# Patient Record
Sex: FEMALE | Race: BLACK OR AFRICAN AMERICAN
[De-identification: names, ages, dates, MRNs, and addresses within clinical notes are randomized per-mention and may not be internally consistent; named-entity substitution may affect disease eponyms.]

---

## 2018-05-16 ENCOUNTER — HOSPITAL ENCOUNTER (EMERGENCY)
Dept: HOSPITAL 74 - JERFT | Age: 23
Discharge: HOME | End: 2018-05-16
Payer: SELF-PAY

## 2018-05-16 VITALS — BODY MASS INDEX: 25.4 KG/M2

## 2018-05-16 VITALS — SYSTOLIC BLOOD PRESSURE: 106 MMHG | DIASTOLIC BLOOD PRESSURE: 66 MMHG | TEMPERATURE: 98.5 F | HEART RATE: 76 BPM

## 2018-05-16 DIAGNOSIS — R07.0: Primary | ICD-10-CM

## 2018-05-16 NOTE — PDOC
Rapid Medical Evaluation


Time Seen by Provider: 05/16/18 15:35


Medical Evaluation: 





05/16/18 15:35





I have performed a brief in-person evaluation of this patient.





The patient presents with a chief complaint of: s/p tonsillectomy 2 weeks ago 

in [place], p/w ?dysphagia and L ear pain since yesterday. No f/c





Pertinent physical exam findings:Stable w/ L ear wnl, will defer proper 

oropharynx exam to FT provider





I have ordered the following:soft tissue neck





The patient will proceed to the ED for further evaluation.








05/16/18 15:43





05/16/18 15:44

## 2018-05-16 NOTE — PDOC
History of Present Illness





- General


Chief Complaint: Ear Problem


Stated Complaint: POST OP PAIN, EAR PROBLEM


Time Seen by Provider: 05/16/18 15:35


History Source: Patient


Exam Limitations: No Limitations





- History of Present Illness


Initial Comments: 





05/16/18 16:00  vision came for evaluation of throat. Status post tonsillectomy 

in [place] 2 weeks ago. And has concerns about residual swelling or food 

retention in her throat. Patient states ate a piece of bread last night and 

states has not felt the same since. Is able to swallow secretions, has no 

choking sensation, taking deep inspiration, and denies fever or any significant 

pain. 





Timing/Duration: unsure


Severity: mild


Associated Symptoms: reports: denies symptoms.  denies: chest pain, cough, fever

/chills, loss of appetite, malaise, shortness of breath





Past History





- Travel


Traveled outside of the country in the last 30 days: No


Close contact w/someone who was outside of country & ill: No





- Past Medical History


Allergies/Adverse Reactions: 


 Allergies











Allergy/AdvReac Type Severity Reaction Status Date / Time


 


No Known Allergies Allergy   Verified 05/16/18 15:40











Home Medications: 


Ambulatory Orders





NK [No Known Home Medication]  05/16/18 








COPD: No





- Suicide/Smoking/Psychosocial Hx


Smoking History: Never smoked


Have you smoked in the past 12 months: No


Information on smoking cessation initiated: No


Hx Alcohol Use: No


Drug/Substance Use Hx: No


Substance Use Type: None





**Review of Systems





- Review of Systems


Able to Perform ROS?: Yes


Is the patient limited English proficient: Yes


Constitutional: Yes: See HPI.  No: Symptoms Reported, Chills, Fever, Malaise


HEENTM: Yes: Symptoms Reported, See HPI, Other (: The swollen lymph nodes).  No

: Throat Pain, Difficulty Swallowing, Mouth Swelling


Respiratory: Yes: See HPI.  No: Symptoms reported, Cough, Shortness of Breath


Neurological: Yes: See HPI.  No: Symptoms reported, Headache


Hematologic/Lymphatic: Yes: Swollen Glands


All Other Systems: Reviewed and Negative





*Physical Exam





- Vital Signs


 Last Vital Signs











Temp Pulse Resp BP Pulse Ox


 


 98.5 F   76   17   106/66   99 


 


 05/16/18 15:37  05/16/18 15:37  05/16/18 15:37  05/16/18 15:37  05/16/18 15:37














- Physical Exam


General Appearance: Yes: Nourished, Appropriately Dressed.  No: Apparent 

Distress


HEENT: positive: VI, Normal ENT Inspection, Normal Voice, TMs Normal, Pharynx 

Normal (no redness, swelling, retained food products, swallowing secretions well

, has some mild tender lymphadenopathy bilateral submandibular nodes and ac  

nodes.).  negative: Muffled/Hoarse voice, Pharyngeal Erythema, Tonsillar 

Erythema, Nasal Congestion, Rhinorrhea


Neck: positive: Tender, Supple, Lymphadenopathy (R), Lymphadenopathy (L)


Respiratory/Chest: positive: Lungs Clear, Normal Breath Sounds


Gastrointestinal/Abdominal: positive: Soft.  negative: Tender


Extremity: positive: Normal Capillary Refill, Normal Inspection, Normal Range 

of Motion


Integumentary: positive: Normal Color, Dry, Warm


Neurologic: positive: CNs II-XII NML intact, Fully Oriented, Alert, Normal Mood/

Affect, Normal Response, Motor Strength 5/5





Medical Decision Making





- Medical Decision Making





05/16/18 16:22


Postoperative with mild lymphadenopathy, no airway obstruction. Suggested follow

-up with ENT for thorough scope evaluation however there is no indication or 

evidence of any retained food products or tissue swelling.





*DC/Admit/Observation/Transfer


Diagnosis at time of Disposition: 


 Throat irritation








- Discharge Dispostion


Disposition: HOME


Condition at time of disposition: Stable


Decision to Admit order: No





- Referrals


Referrals: 


German Mason MD [Staff Physician] - 





- Patient Instructions


Printed Discharge Instructions:  DI for Lymphadenopathy


Additional Instructions: 


Rest, drink lots of fluids: Teas, water, soups


Saltwater gargles/ keep mouth clean and rinse after each meal


May use wet teabag for pain relief to area


Avoid hard chewing foods, stick to ice cream, Jell-O, yogurt etc.





Tylenol or Motrin for fever and pain


Complete all medication as prescribed





Followup with private physician/ENT  in one to 2 days as needed


Return to emergency department for worsened symptoms, fevers, swelling to face 

or worsened pain





- Post Discharge Activity